# Patient Record
Sex: FEMALE | Race: WHITE | Employment: FULL TIME | URBAN - METROPOLITAN AREA
[De-identification: names, ages, dates, MRNs, and addresses within clinical notes are randomized per-mention and may not be internally consistent; named-entity substitution may affect disease eponyms.]

---

## 2022-08-31 ENCOUNTER — APPOINTMENT (OUTPATIENT)
Dept: CT IMAGING | Age: 57
End: 2022-08-31
Attending: PHYSICIAN ASSISTANT

## 2022-08-31 ENCOUNTER — APPOINTMENT (OUTPATIENT)
Dept: ULTRASOUND IMAGING | Age: 57
End: 2022-08-31
Attending: PHYSICIAN ASSISTANT

## 2022-08-31 ENCOUNTER — HOSPITAL ENCOUNTER (EMERGENCY)
Age: 57
Discharge: HOME | End: 2022-08-31
Attending: EMERGENCY MEDICINE

## 2022-08-31 VITALS
HEART RATE: 88 BPM | DIASTOLIC BLOOD PRESSURE: 82 MMHG | OXYGEN SATURATION: 97 % | HEIGHT: 65 IN | WEIGHT: 192 LBS | RESPIRATION RATE: 18 BRPM | TEMPERATURE: 97.1 F | SYSTOLIC BLOOD PRESSURE: 172 MMHG | BODY MASS INDEX: 31.99 KG/M2

## 2022-08-31 DIAGNOSIS — K80.50 BILIARY COLIC: Primary | ICD-10-CM

## 2022-08-31 DIAGNOSIS — N39.0 URINARY TRACT INFECTION WITHOUT HEMATURIA, SITE UNSPECIFIED: ICD-10-CM

## 2022-08-31 DIAGNOSIS — K76.0 HEPATIC STEATOSIS: ICD-10-CM

## 2022-08-31 DIAGNOSIS — R73.9 HYPERGLYCEMIA: ICD-10-CM

## 2022-08-31 LAB
ALBUMIN SERPL-MCNC: 3.9 G/DL (ref 3.4–5)
ALBUMIN/GLOB SERPL: 1.3 {RATIO} (ref 0.8–1.7)
ALP SERPL-CCNC: 124 U/L (ref 45–117)
ALT SERPL-CCNC: 61 U/L (ref 13–56)
ANION GAP SERPL CALC-SCNC: 7 MMOL/L (ref 3–18)
APPEARANCE UR: CLEAR
AST SERPL-CCNC: 40 U/L (ref 10–38)
BACTERIA URNS QL MICRO: ABNORMAL /HPF
BASOPHILS # BLD: 0 K/UL (ref 0–0.1)
BASOPHILS NFR BLD: 0 % (ref 0–2)
BILIRUB SERPL-MCNC: 0.7 MG/DL (ref 0.2–1)
BILIRUB UR QL: NEGATIVE
BUN SERPL-MCNC: 14 MG/DL (ref 7–18)
BUN/CREAT SERPL: 26 (ref 12–20)
CALCIUM SERPL-MCNC: 9.2 MG/DL (ref 8.5–10.1)
CHLORIDE SERPL-SCNC: 103 MMOL/L (ref 100–111)
CO2 SERPL-SCNC: 27 MMOL/L (ref 21–32)
COLOR UR: YELLOW
CREAT SERPL-MCNC: 0.53 MG/DL (ref 0.6–1.3)
DIFFERENTIAL METHOD BLD: ABNORMAL
EOSINOPHIL # BLD: 0.1 K/UL (ref 0–0.4)
EOSINOPHIL NFR BLD: 1 % (ref 0–5)
EPITH CASTS URNS QL MICRO: ABNORMAL /LPF (ref 0–5)
ERYTHROCYTE [DISTWIDTH] IN BLOOD BY AUTOMATED COUNT: 12.7 % (ref 11.6–14.5)
EST. AVERAGE GLUCOSE BLD GHB EST-MCNC: 255 MG/DL
GLOBULIN SER CALC-MCNC: 2.9 G/DL (ref 2–4)
GLUCOSE SERPL-MCNC: 334 MG/DL (ref 74–99)
GLUCOSE UR STRIP.AUTO-MCNC: >1000 MG/DL
HBA1C MFR BLD: 10.5 % (ref 4.2–5.6)
HCT VFR BLD AUTO: 40.8 % (ref 35–45)
HGB BLD-MCNC: 14.2 G/DL (ref 12–16)
HGB UR QL STRIP: NEGATIVE
IMM GRANULOCYTES # BLD AUTO: 0 K/UL (ref 0–0.04)
IMM GRANULOCYTES NFR BLD AUTO: 1 % (ref 0–0.5)
KETONES UR QL STRIP.AUTO: ABNORMAL MG/DL
LEUKOCYTE ESTERASE UR QL STRIP.AUTO: ABNORMAL
LIPASE SERPL-CCNC: 89 U/L (ref 73–393)
LYMPHOCYTES # BLD: 1.5 K/UL (ref 0.9–3.6)
LYMPHOCYTES NFR BLD: 23 % (ref 21–52)
MCH RBC QN AUTO: 29.8 PG (ref 24–34)
MCHC RBC AUTO-ENTMCNC: 34.8 G/DL (ref 31–37)
MCV RBC AUTO: 85.7 FL (ref 78–100)
MONOCYTES # BLD: 0.3 K/UL (ref 0.05–1.2)
MONOCYTES NFR BLD: 5 % (ref 3–10)
NEUTS SEG # BLD: 4.5 K/UL (ref 1.8–8)
NEUTS SEG NFR BLD: 70 % (ref 40–73)
NITRITE UR QL STRIP.AUTO: POSITIVE
NRBC # BLD: 0 K/UL (ref 0–0.01)
NRBC BLD-RTO: 0 PER 100 WBC
PH UR STRIP: 5.5 [PH] (ref 5–8)
PLATELET # BLD AUTO: 182 K/UL (ref 135–420)
PMV BLD AUTO: 9.6 FL (ref 9.2–11.8)
POTASSIUM SERPL-SCNC: 3.8 MMOL/L (ref 3.5–5.5)
PROT SERPL-MCNC: 6.8 G/DL (ref 6.4–8.2)
PROT UR STRIP-MCNC: 100 MG/DL
RBC # BLD AUTO: 4.76 M/UL (ref 4.2–5.3)
RBC #/AREA URNS HPF: ABNORMAL /HPF (ref 0–5)
SODIUM SERPL-SCNC: 137 MMOL/L (ref 136–145)
SP GR UR REFRACTOMETRY: >1.03 (ref 1–1.03)
UROBILINOGEN UR QL STRIP.AUTO: 1 EU/DL (ref 0.2–1)
WBC # BLD AUTO: 6.5 K/UL (ref 4.6–13.2)
WBC URNS QL MICRO: ABNORMAL /HPF (ref 0–5)

## 2022-08-31 PROCEDURE — 74177 CT ABD & PELVIS W/CONTRAST: CPT

## 2022-08-31 PROCEDURE — 74011000636 HC RX REV CODE- 636: Performed by: EMERGENCY MEDICINE

## 2022-08-31 PROCEDURE — 87077 CULTURE AEROBIC IDENTIFY: CPT

## 2022-08-31 PROCEDURE — 87086 URINE CULTURE/COLONY COUNT: CPT

## 2022-08-31 PROCEDURE — 87186 SC STD MICRODIL/AGAR DIL: CPT

## 2022-08-31 PROCEDURE — 80053 COMPREHEN METABOLIC PANEL: CPT

## 2022-08-31 PROCEDURE — 76705 ECHO EXAM OF ABDOMEN: CPT

## 2022-08-31 PROCEDURE — 85025 COMPLETE CBC W/AUTO DIFF WBC: CPT

## 2022-08-31 PROCEDURE — 99285 EMERGENCY DEPT VISIT HI MDM: CPT

## 2022-08-31 PROCEDURE — 83690 ASSAY OF LIPASE: CPT

## 2022-08-31 PROCEDURE — 81001 URINALYSIS AUTO W/SCOPE: CPT

## 2022-08-31 PROCEDURE — 96374 THER/PROPH/DIAG INJ IV PUSH: CPT

## 2022-08-31 PROCEDURE — 96375 TX/PRO/DX INJ NEW DRUG ADDON: CPT

## 2022-08-31 PROCEDURE — 74011250636 HC RX REV CODE- 250/636: Performed by: PHYSICIAN ASSISTANT

## 2022-08-31 PROCEDURE — 74011000258 HC RX REV CODE- 258: Performed by: PHYSICIAN ASSISTANT

## 2022-08-31 PROCEDURE — 83036 HEMOGLOBIN GLYCOSYLATED A1C: CPT

## 2022-08-31 RX ORDER — MORPHINE SULFATE 4 MG/ML
4 INJECTION INTRAVENOUS
Status: COMPLETED | OUTPATIENT
Start: 2022-08-31 | End: 2022-08-31

## 2022-08-31 RX ORDER — HYDROCODONE BITARTRATE AND ACETAMINOPHEN 5; 325 MG/1; MG/1
1 TABLET ORAL
Qty: 12 TABLET | Refills: 0 | Status: SHIPPED | OUTPATIENT
Start: 2022-08-31 | End: 2022-09-03

## 2022-08-31 RX ORDER — ONDANSETRON 2 MG/ML
4 INJECTION INTRAMUSCULAR; INTRAVENOUS
Status: COMPLETED | OUTPATIENT
Start: 2022-08-31 | End: 2022-08-31

## 2022-08-31 RX ORDER — CEPHALEXIN 500 MG/1
500 CAPSULE ORAL 3 TIMES DAILY
Qty: 21 CAPSULE | Refills: 0 | Status: SHIPPED | OUTPATIENT
Start: 2022-08-31 | End: 2022-09-07

## 2022-08-31 RX ORDER — KETOROLAC TROMETHAMINE 10 MG/1
10 TABLET, FILM COATED ORAL
Qty: 15 TABLET | Refills: 0 | Status: SHIPPED | OUTPATIENT
Start: 2022-08-31

## 2022-08-31 RX ADMIN — CEFTRIAXONE 1 G: 1 INJECTION, POWDER, FOR SOLUTION INTRAMUSCULAR; INTRAVENOUS at 13:18

## 2022-08-31 RX ADMIN — MORPHINE SULFATE 4 MG: 4 INJECTION INTRAVENOUS at 11:19

## 2022-08-31 RX ADMIN — IOPAMIDOL 100 ML: 612 INJECTION, SOLUTION INTRAVENOUS at 12:15

## 2022-08-31 RX ADMIN — ONDANSETRON 4 MG: 2 INJECTION INTRAMUSCULAR; INTRAVENOUS at 11:18

## 2022-08-31 NOTE — ED NOTES
Verbal shift change report given to Juan Carolina RN (oncoming nurse). Report included the following information SBAR, ED Summary and MAR.

## 2022-08-31 NOTE — DISCHARGE INSTRUCTIONS
Clear liquids. [place]ugio diet. Avoid high fatty foods  Medications as prescribed for pain related to abdominal pain  Urinary tract infection -urine culture is pending. Antibiotic as prescribed  Hemoglobin A1c is pending. Have your family physician follow-up with this lab  Worse?   Return to ER immediately

## 2022-09-03 LAB
BACTERIA SPEC CULT: ABNORMAL
CC UR VC: ABNORMAL
SERVICE CMNT-IMP: ABNORMAL

## 2022-09-04 NOTE — CALL BACK NOTE
8:29 AM  09/04/2022    On keflex for UTI. Sensitive per C&S. On appropriate tx.      Johnny Jo, MATT

## 2025-05-22 ENCOUNTER — APPOINTMENT (OUTPATIENT)
Facility: HOSPITAL | Age: 60
End: 2025-05-22

## 2025-05-22 ENCOUNTER — HOSPITAL ENCOUNTER (EMERGENCY)
Facility: HOSPITAL | Age: 60
Discharge: HOME | End: 2025-05-22

## 2025-05-22 VITALS
SYSTOLIC BLOOD PRESSURE: 137 MMHG | TEMPERATURE: 97.5 F | RESPIRATION RATE: 18 BRPM | OXYGEN SATURATION: 96 % | DIASTOLIC BLOOD PRESSURE: 64 MMHG | HEART RATE: 78 BPM

## 2025-05-22 DIAGNOSIS — R53.83 FATIGUE, UNSPECIFIED TYPE: ICD-10-CM

## 2025-05-22 DIAGNOSIS — R73.9 HYPERGLYCEMIA: ICD-10-CM

## 2025-05-22 DIAGNOSIS — N76.2 CELLULITIS OF LABIA MAJORA: ICD-10-CM

## 2025-05-22 DIAGNOSIS — N30.00 ACUTE CYSTITIS WITHOUT HEMATURIA: ICD-10-CM

## 2025-05-22 DIAGNOSIS — R03.0 ELEVATED BLOOD PRESSURE READING: ICD-10-CM

## 2025-05-22 DIAGNOSIS — R51.9 ACUTE NONINTRACTABLE HEADACHE, UNSPECIFIED HEADACHE TYPE: Primary | ICD-10-CM

## 2025-05-22 LAB
ALBUMIN SERPL-MCNC: 4.1 G/DL (ref 3.4–5)
ALBUMIN/GLOB SERPL: 1.5 (ref 0.8–1.7)
ALP SERPL-CCNC: 103 U/L (ref 45–117)
ALT SERPL-CCNC: 41 U/L (ref 10–35)
ANION GAP SERPL CALC-SCNC: 14 MMOL/L (ref 3–18)
APPEARANCE UR: CLEAR
AST SERPL-CCNC: 25 U/L (ref 10–38)
BACTERIA URNS QL MICRO: ABNORMAL /HPF
BASOPHILS # BLD: 0.03 K/UL (ref 0–0.1)
BASOPHILS NFR BLD: 0.3 % (ref 0–2)
BILIRUB SERPL-MCNC: 1 MG/DL (ref 0.2–1)
BILIRUB UR QL: NEGATIVE
BUN SERPL-MCNC: 8 MG/DL (ref 6–23)
BUN/CREAT SERPL: 20 (ref 12–20)
CALCIUM SERPL-MCNC: 9.8 MG/DL (ref 8.5–10.1)
CHLORIDE SERPL-SCNC: 97 MMOL/L (ref 98–107)
CO2 SERPL-SCNC: 25 MMOL/L (ref 21–32)
COLOR UR: YELLOW
CREAT SERPL-MCNC: 0.43 MG/DL (ref 0.6–1.3)
DIFFERENTIAL METHOD BLD: ABNORMAL
EOSINOPHIL # BLD: 0.05 K/UL (ref 0–0.4)
EOSINOPHIL NFR BLD: 0.5 % (ref 0–5)
EPITH CASTS URNS QL MICRO: ABNORMAL /LPF (ref 0–5)
ERYTHROCYTE [DISTWIDTH] IN BLOOD BY AUTOMATED COUNT: 12.3 % (ref 11.6–14.5)
GLOBULIN SER CALC-MCNC: 2.8 G/DL (ref 2–4)
GLUCOSE BLD STRIP.AUTO-MCNC: 260 MG/DL (ref 70–110)
GLUCOSE BLD STRIP.AUTO-MCNC: 290 MG/DL (ref 70–110)
GLUCOSE SERPL-MCNC: 294 MG/DL (ref 74–108)
GLUCOSE UR STRIP.AUTO-MCNC: >1000 MG/DL
HCT VFR BLD AUTO: 39.7 % (ref 35–45)
HGB BLD-MCNC: 13.9 G/DL (ref 12–16)
HGB UR QL STRIP: NEGATIVE
IMM GRANULOCYTES # BLD AUTO: 0.03 K/UL (ref 0–0.04)
IMM GRANULOCYTES NFR BLD AUTO: 0.3 % (ref 0–0.5)
KETONES UR QL STRIP.AUTO: 40 MG/DL
LEUKOCYTE ESTERASE UR QL STRIP.AUTO: ABNORMAL
LYMPHOCYTES # BLD: 1.86 K/UL (ref 0.9–3.3)
LYMPHOCYTES NFR BLD: 17.5 % (ref 21–52)
MCH RBC QN AUTO: 30.6 PG (ref 24–34)
MCHC RBC AUTO-ENTMCNC: 35 G/DL (ref 31–37)
MCV RBC AUTO: 87.4 FL (ref 78–100)
MONOCYTES # BLD: 0.63 K/UL (ref 0.05–1.2)
MONOCYTES NFR BLD: 5.9 % (ref 3–10)
NEUTS SEG # BLD: 8.03 K/UL (ref 1.8–8)
NEUTS SEG NFR BLD: 75.5 % (ref 40–73)
NITRITE UR QL STRIP.AUTO: NEGATIVE
NRBC # BLD: 0 K/UL (ref 0–0.01)
NRBC BLD-RTO: 0 PER 100 WBC
PH UR STRIP: 6 (ref 5–8)
PLATELET # BLD AUTO: 172 K/UL (ref 135–420)
PMV BLD AUTO: 10 FL (ref 9.2–11.8)
POTASSIUM SERPL-SCNC: 3.6 MMOL/L (ref 3.5–5.5)
PROT SERPL-MCNC: 6.8 G/DL (ref 6.4–8.2)
PROT UR STRIP-MCNC: 100 MG/DL
RBC # BLD AUTO: 4.54 M/UL (ref 4.2–5.3)
RBC #/AREA URNS HPF: NEGATIVE /HPF (ref 0–5)
SODIUM SERPL-SCNC: 135 MMOL/L (ref 136–145)
SP GR UR REFRACTOMETRY: >1.03 (ref 1–1.03)
TROPONIN T SERPL HS-MCNC: 6.7 NG/L (ref 0–14)
TROPONIN T SERPL HS-MCNC: 6.8 NG/L (ref 0–14)
UROBILINOGEN UR QL STRIP.AUTO: 1 EU/DL (ref 0.2–1)
WBC # BLD AUTO: 10.6 K/UL (ref 4.6–13.2)
WBC URNS QL MICRO: ABNORMAL /HPF (ref 0–5)

## 2025-05-22 PROCEDURE — 6360000002 HC RX W HCPCS

## 2025-05-22 PROCEDURE — 99284 EMERGENCY DEPT VISIT MOD MDM: CPT

## 2025-05-22 PROCEDURE — 70450 CT HEAD/BRAIN W/O DYE: CPT

## 2025-05-22 PROCEDURE — 81001 URINALYSIS AUTO W/SCOPE: CPT

## 2025-05-22 PROCEDURE — 80053 COMPREHEN METABOLIC PANEL: CPT

## 2025-05-22 PROCEDURE — 96374 THER/PROPH/DIAG INJ IV PUSH: CPT

## 2025-05-22 PROCEDURE — 93005 ELECTROCARDIOGRAM TRACING: CPT | Performed by: EMERGENCY MEDICINE

## 2025-05-22 PROCEDURE — 82962 GLUCOSE BLOOD TEST: CPT

## 2025-05-22 PROCEDURE — 6370000000 HC RX 637 (ALT 250 FOR IP)

## 2025-05-22 PROCEDURE — 84484 ASSAY OF TROPONIN QUANT: CPT

## 2025-05-22 PROCEDURE — 96361 HYDRATE IV INFUSION ADD-ON: CPT

## 2025-05-22 PROCEDURE — 2580000003 HC RX 258

## 2025-05-22 PROCEDURE — 85025 COMPLETE CBC W/AUTO DIFF WBC: CPT

## 2025-05-22 RX ORDER — 0.9 % SODIUM CHLORIDE 0.9 %
1000 INTRAVENOUS SOLUTION INTRAVENOUS
Status: COMPLETED | OUTPATIENT
Start: 2025-05-22 | End: 2025-05-22

## 2025-05-22 RX ORDER — ACETAMINOPHEN 325 MG/1
650 TABLET ORAL
Status: COMPLETED | OUTPATIENT
Start: 2025-05-22 | End: 2025-05-22

## 2025-05-22 RX ORDER — KETOROLAC TROMETHAMINE 15 MG/ML
15 INJECTION, SOLUTION INTRAMUSCULAR; INTRAVENOUS
Status: COMPLETED | OUTPATIENT
Start: 2025-05-22 | End: 2025-05-22

## 2025-05-22 RX ORDER — CEPHALEXIN 500 MG/1
500 CAPSULE ORAL 2 TIMES DAILY
Qty: 14 CAPSULE | Refills: 0 | Status: SHIPPED | OUTPATIENT
Start: 2025-05-22 | End: 2025-05-29

## 2025-05-22 RX ADMIN — SODIUM CHLORIDE 1000 ML: 0.9 INJECTION, SOLUTION INTRAVENOUS at 11:51

## 2025-05-22 RX ADMIN — KETOROLAC TROMETHAMINE 15 MG: 15 INJECTION, SOLUTION INTRAMUSCULAR; INTRAVENOUS at 13:31

## 2025-05-22 RX ADMIN — ACETAMINOPHEN 650 MG: 325 TABLET ORAL at 13:16

## 2025-05-22 ASSESSMENT — PAIN DESCRIPTION - DESCRIPTORS: DESCRIPTORS: ACHING

## 2025-05-22 ASSESSMENT — PAIN SCALES - GENERAL: PAINLEVEL_OUTOF10: 7

## 2025-05-22 ASSESSMENT — PAIN DESCRIPTION - LOCATION: LOCATION: HEAD

## 2025-05-22 NOTE — DISCHARGE INSTRUCTIONS
Thank you for choosing our Emergency Department for your care.  It is our privilege to care for you in your time of need.  In the next several days, you may receive a survey via email or mailed to your home about your experience with our team.  We would greatly appreciate you taking a few minutes to complete the survey, as we use this information to learn what we have done well and what we could be doing better. Thank you for trusting us with your care!    Below you will find a list of your tests from today's visit.   Labs and Radiology Studies  Recent Results (from the past 12 hours)   POCT Glucose    Collection Time: 05/22/25 10:25 AM   Result Value Ref Range    POC Glucose 290 (H) 70 - 110 mg/dL   EKG 12 Lead    Collection Time: 05/22/25 10:49 AM   Result Value Ref Range    Ventricular Rate 84 BPM    Atrial Rate 84 BPM    P-R Interval 160 ms    QRS Duration 78 ms    Q-T Interval 368 ms    QTc Calculation (Bazett) 434 ms    R Axis -19 degrees    T Axis 42 degrees    Diagnosis       Sinus rhythm with premature atrial complexes  Septal infarct , age undetermined  Abnormal ECG  No previous ECGs available     CBC with Auto Differential    Collection Time: 05/22/25 10:58 AM   Result Value Ref Range    WBC 10.6 4.6 - 13.2 K/uL    RBC 4.54 4.20 - 5.30 M/uL    Hemoglobin 13.9 12.0 - 16.0 g/dL    Hematocrit 39.7 35.0 - 45.0 %    MCV 87.4 78.0 - 100.0 FL    MCH 30.6 24.0 - 34.0 PG    MCHC 35.0 31.0 - 37.0 g/dL    RDW 12.3 11.6 - 14.5 %    Platelets 172 135 - 420 K/uL    MPV 10.0 9.2 - 11.8 FL    Nucleated RBCs 0.0 0  WBC    nRBC 0.00 0.00 - 0.01 K/uL    Neutrophils % 75.5 (H) 40.0 - 73.0 %    Lymphocytes % 17.5 (L) 21.0 - 52.0 %    Monocytes % 5.9 3.0 - 10.0 %    Eosinophils % 0.5 0.0 - 5.0 %    Basophils % 0.3 0.0 - 2.0 %    Immature Granulocytes % 0.3 0.0 - 0.5 %    Neutrophils Absolute 8.03 (H) 1.80 - 8.00 K/UL    Lymphocytes Absolute 1.86 0.90 - 3.30 K/UL    Monocytes Absolute 0.63 0.05 - 1.20 K/UL     Eosinophils Absolute 0.05 0.00 - 0.40 K/UL    Basophils Absolute 0.03 0.00 - 0.10 K/UL    Immature Granulocytes Absolute 0.03 0.00 - 0.04 K/UL    Differential Type AUTOMATED     Comprehensive Metabolic Panel    Collection Time: 05/22/25 10:58 AM   Result Value Ref Range    Sodium 135 (L) 136 - 145 mmol/L    Potassium 3.6 3.5 - 5.5 mmol/L    Chloride 97 (L) 98 - 107 mmol/L    CO2 25 21 - 32 mmol/L    Anion Gap 14 3 - 18 mmol/L    Glucose 294 (H) 74 - 108 mg/dL    BUN 8 6 - 23 MG/DL    Creatinine 0.43 (L) 0.60 - 1.30 MG/DL    BUN/Creatinine Ratio 20 12 - 20      Est, Glom Filt Rate >90 >60 ml/min/1.73m2    Calcium 9.8 8.5 - 10.1 MG/DL    Total Bilirubin 1.0 0.2 - 1.0 MG/DL    ALT 41 (H) 10 - 35 U/L    AST 25 10 - 38 U/L    Alk Phosphatase 103 45 - 117 U/L    Total Protein 6.8 6.4 - 8.2 g/dL    Albumin 4.1 3.4 - 5.0 g/dL    Globulin 2.8 2.0 - 4.0 g/dL    Albumin/Globulin Ratio 1.5 0.8 - 1.7     Troponin    Collection Time: 05/22/25 10:58 AM   Result Value Ref Range    Troponin T 6.8 0.0 - 14.0 ng/L   Urinalysis    Collection Time: 05/22/25 11:14 AM   Result Value Ref Range    Color, UA YELLOW      Appearance CLEAR      Specific Gravity, UA >1.030 (H) 1.003 - 1.030    pH, Urine 6.0 5.0 - 8.0      Protein,  (A) NEG mg/dL    Glucose, Ur >1000 (A) NEG mg/dL    Ketones, Urine 40 (A) NEG mg/dL    Bilirubin, Urine Negative NEG      Blood, Urine Negative NEG      Urobilinogen, Urine 1.0 0.2 - 1.0 EU/dL    Nitrite, Urine Negative NEG      Leukocyte Esterase, Urine TRACE (A) NEG     Urinalysis, Micro    Collection Time: 05/22/25 11:14 AM   Result Value Ref Range    WBC, UA 11-20 0 - 5 /hpf    RBC, UA Negative 0 - 5 /hpf    Epithelial Cells, UA FEW 0 - 5 /lpf    BACTERIA, URINE 2+ (A) NEG /hpf   Troponin    Collection Time: 05/22/25  2:10 PM   Result Value Ref Range    Troponin T 6.7 0.0 - 14.0 ng/L   POCT Glucose    Collection Time: 05/22/25  3:21 PM   Result Value Ref Range    POC Glucose 260 (H) 70 - 110 mg/dL     CT

## 2025-05-22 NOTE — ED PROVIDER NOTES
[facility] EMERGENCY DEPT  EMERGENCY DEPARTMENT HISTORY AND PHYSICAL EXAM      Date of evaluation: 5/22/2025  Patient Name: Nathalie Jeter  Birthdate 1965  MRN: [id number]  ED Provider: Zoe North PA-C   Note Started: 12:18 PM EDT 5/22/25    HISTORY OF PRESENT ILLNESS     Chief Complaint   Patient presents with    Headache    Hyperglycemia       History Provided By: Patient, only     HPI: Nathalie Jeter is a 60 y.o. female with PMH of type II DM, HTN, and nephrolithiasis who presents ambulatory to the ED as a referral from urgent care for fatigue and headache that began yesterday but worsened today.  When asked to elaborate on symptoms, patient states that she just feels generally unwell.  She states that she felt similar in the past when her blood glucose was high which is what prompted her to go to local urgent care.  POC glucose on arrival to urgent care was 310.  Patient states that she does not currently have a PCP and has not been on metformin for approximately 3 years.  She denies ever being hospitalized for hyperglycemia in the past.  She has never been on insulin.  No known sick contacts.  No fevers, chills, cough, congestion, rhinorrhea, sore throat, body aches, dizziness, lightheadedness, vision changes, tinnitus, neck pain/stiffness, nausea, vomiting, diarrhea, constipation, dysuria, urinary frequency/incontinence/retention, SOB, or CP. While at urgent care, EKG and UA were obtained.  Patient was told that she had \"many PACs\" and a UTI.    PAST MEDICAL HISTORY   Past Medical History:  Past Medical History:   Diagnosis Date    Kidney stones        Past Surgical History:  Past Surgical History:   Procedure Laterality Date    APPENDECTOMY         Family History:  No family history on file.    Social History:  Social History     Tobacco Use    Smoking status: Former   Substance Use Topics    Alcohol use: No       Allergies:  No Known Allergies    PCP: None, None    Current Meds:   No current  Cells, UA FEW 0 - 5 /lpf    BACTERIA, URINE 2+ (A) NEG /hpf   Troponin    Collection Time: 05/22/25  2:10 PM   Result Value Ref Range    Troponin T 6.7 0.0 - 14.0 ng/L   POCT Glucose    Collection Time: 05/22/25  3:21 PM   Result Value Ref Range    POC Glucose 260 (H) 70 - 110 mg/dL       EKG:.See ED Course Below    Radiologic Studies:  Radiographic images are visualized and preliminarily interpreted by the ED Provider with the following findings: See ED Course Below.     Interpretation per the Radiologist below, if available at the time of this note:  CT HEAD WO CONTRAST   Final Result   No acute intracranial hemorrhage, mass or infarct.             Electronically signed by Dewey So MD           Records Reviewed: Not Applicable    MEDICAL DECISION MAKING and ED COURSE   3:44 PM Differential and Considerations of tests not ordered: Patient presents ambulatory to the ED as a referral from urgent care for fatigue and headache that began yesterday but worsened today.  When asked to elaborate on symptoms, patient states that she just feels generally unwell.  She states that she felt similar in the past when her blood glucose was high which is what prompted her to go to local urgent care.  POC glucose on arrival to urgent care was 310.  Patient states that she does not currently have a PCP and has not been on metformin for approximately 3 years.  She denies ever being hospitalized for hyperglycemia in the past.  She has never been on insulin. While at urgent care, EKG and UA were obtained.  Patient was told that she had \"many PACs\" and a UTI.  Patient denies all other symptoms.    On arrival, patient appears in no acute distress.  BP elevated but other vital signs are reassuring.  Examination reveals no significant abnormalities other than erythema, warmth, tenderness, and induration to left labia without obvious fluctuance to suggest abscess.  Will likely prescribe antibiotics for suspected UTI which should also

## 2025-05-22 NOTE — ED TRIAGE NOTES
Patient ambulatory to ED, POV from urgent care. Patient c/o loss of appetite, headache and increased fatigue. States she went to urgent care and is hyperglycemic at 310, has a UTI and EKG showing PAC's.      Denies chest pain or SOB at this time. Able to speak in full sentences.     Triage blood glucose 218mg/dL

## 2025-05-23 LAB
EKG ATRIAL RATE: 84 BPM
EKG DIAGNOSIS: NORMAL
EKG P-R INTERVAL: 160 MS
EKG Q-T INTERVAL: 368 MS
EKG QRS DURATION: 78 MS
EKG QTC CALCULATION (BAZETT): 434 MS
EKG R AXIS: -19 DEGREES
EKG T AXIS: 42 DEGREES
EKG VENTRICULAR RATE: 84 BPM

## 2025-05-25 ENCOUNTER — HOSPITAL ENCOUNTER (EMERGENCY)
Facility: HOSPITAL | Age: 60
Discharge: HOME | End: 2025-05-25

## 2025-05-25 VITALS
TEMPERATURE: 97.3 F | RESPIRATION RATE: 16 BRPM | WEIGHT: 182 LBS | BODY MASS INDEX: 30.32 KG/M2 | OXYGEN SATURATION: 100 % | SYSTOLIC BLOOD PRESSURE: 166 MMHG | HEART RATE: 76 BPM | HEIGHT: 65 IN | DIASTOLIC BLOOD PRESSURE: 98 MMHG

## 2025-05-25 DIAGNOSIS — N76.2 CELLULITIS OF LABIA: ICD-10-CM

## 2025-05-25 DIAGNOSIS — Z86.39 HISTORY OF UNCONTROLLED DIABETES: ICD-10-CM

## 2025-05-25 DIAGNOSIS — L02.215 ABSCESS OF PERINEUM: Primary | ICD-10-CM

## 2025-05-25 LAB
ALBUMIN SERPL-MCNC: 3.6 G/DL (ref 3.4–5)
ALBUMIN/GLOB SERPL: 1 (ref 0.8–1.7)
ALP SERPL-CCNC: 149 U/L (ref 45–117)
ALT SERPL-CCNC: 40 U/L (ref 10–35)
ANION GAP SERPL CALC-SCNC: 14 MMOL/L (ref 3–18)
AST SERPL-CCNC: 26 U/L (ref 10–38)
BASOPHILS # BLD: 0.04 K/UL (ref 0–0.1)
BASOPHILS NFR BLD: 0.5 % (ref 0–2)
BILIRUB SERPL-MCNC: 0.5 MG/DL (ref 0.2–1)
BUN SERPL-MCNC: 13 MG/DL (ref 6–23)
BUN/CREAT SERPL: 32 (ref 12–20)
CALCIUM SERPL-MCNC: 10 MG/DL (ref 8.5–10.1)
CHLORIDE SERPL-SCNC: 99 MMOL/L (ref 98–107)
CO2 SERPL-SCNC: 23 MMOL/L (ref 21–32)
CREAT SERPL-MCNC: 0.42 MG/DL (ref 0.6–1.3)
DIFFERENTIAL METHOD BLD: ABNORMAL
EOSINOPHIL # BLD: 0.12 K/UL (ref 0–0.4)
EOSINOPHIL NFR BLD: 1.4 % (ref 0–5)
ERYTHROCYTE [DISTWIDTH] IN BLOOD BY AUTOMATED COUNT: 12.2 % (ref 11.6–14.5)
GLOBULIN SER CALC-MCNC: 3.6 G/DL (ref 2–4)
GLUCOSE BLD STRIP.AUTO-MCNC: 329 MG/DL (ref 70–110)
GLUCOSE SERPL-MCNC: 283 MG/DL (ref 74–108)
HCT VFR BLD AUTO: 42.6 % (ref 35–45)
HGB BLD-MCNC: 14.8 G/DL (ref 12–16)
IMM GRANULOCYTES # BLD AUTO: 0.03 K/UL (ref 0–0.04)
IMM GRANULOCYTES NFR BLD AUTO: 0.3 % (ref 0–0.5)
LYMPHOCYTES # BLD: 1.55 K/UL (ref 0.9–3.3)
LYMPHOCYTES NFR BLD: 18 % (ref 21–52)
MCH RBC QN AUTO: 30.5 PG (ref 24–34)
MCHC RBC AUTO-ENTMCNC: 34.7 G/DL (ref 31–37)
MCV RBC AUTO: 87.7 FL (ref 78–100)
MONOCYTES # BLD: 0.51 K/UL (ref 0.05–1.2)
MONOCYTES NFR BLD: 5.9 % (ref 3–10)
NEUTS SEG # BLD: 6.35 K/UL (ref 1.8–8)
NEUTS SEG NFR BLD: 73.9 % (ref 40–73)
NRBC # BLD: 0 K/UL (ref 0–0.01)
NRBC BLD-RTO: 0 PER 100 WBC
PLATELET # BLD AUTO: 208 K/UL (ref 135–420)
PMV BLD AUTO: 9.9 FL (ref 9.2–11.8)
POTASSIUM SERPL-SCNC: 3.7 MMOL/L (ref 3.5–5.5)
PROT SERPL-MCNC: 7.2 G/DL (ref 6.4–8.2)
RBC # BLD AUTO: 4.86 M/UL (ref 4.2–5.3)
SODIUM SERPL-SCNC: 136 MMOL/L (ref 136–145)
WBC # BLD AUTO: 8.6 K/UL (ref 4.6–13.2)

## 2025-05-25 PROCEDURE — 87070 CULTURE OTHR SPECIMN AEROBIC: CPT

## 2025-05-25 PROCEDURE — 87077 CULTURE AEROBIC IDENTIFY: CPT

## 2025-05-25 PROCEDURE — 87205 SMEAR GRAM STAIN: CPT

## 2025-05-25 PROCEDURE — 56405 I&D VULVA/PERINEAL ABSCESS: CPT

## 2025-05-25 PROCEDURE — 96375 TX/PRO/DX INJ NEW DRUG ADDON: CPT

## 2025-05-25 PROCEDURE — 2580000003 HC RX 258: Performed by: PHYSICIAN ASSISTANT

## 2025-05-25 PROCEDURE — 99284 EMERGENCY DEPT VISIT MOD MDM: CPT

## 2025-05-25 PROCEDURE — 80053 COMPREHEN METABOLIC PANEL: CPT

## 2025-05-25 PROCEDURE — 82962 GLUCOSE BLOOD TEST: CPT

## 2025-05-25 PROCEDURE — 85025 COMPLETE CBC W/AUTO DIFF WBC: CPT

## 2025-05-25 PROCEDURE — 87186 SC STD MICRODIL/AGAR DIL: CPT

## 2025-05-25 PROCEDURE — 6360000002 HC RX W HCPCS: Performed by: PHYSICIAN ASSISTANT

## 2025-05-25 PROCEDURE — 96365 THER/PROPH/DIAG IV INF INIT: CPT

## 2025-05-25 RX ORDER — DOXYCYCLINE HYCLATE 100 MG
100 TABLET ORAL 2 TIMES DAILY
Qty: 20 TABLET | Refills: 0 | Status: SHIPPED | OUTPATIENT
Start: 2025-05-25 | End: 2025-06-04

## 2025-05-25 RX ORDER — MORPHINE SULFATE 4 MG/ML
4 INJECTION, SOLUTION INTRAMUSCULAR; INTRAVENOUS
Refills: 0 | Status: COMPLETED | OUTPATIENT
Start: 2025-05-25 | End: 2025-05-25

## 2025-05-25 RX ORDER — ONDANSETRON 2 MG/ML
4 INJECTION INTRAMUSCULAR; INTRAVENOUS
Status: COMPLETED | OUTPATIENT
Start: 2025-05-25 | End: 2025-05-25

## 2025-05-25 RX ORDER — 0.9 % SODIUM CHLORIDE 0.9 %
1000 INTRAVENOUS SOLUTION INTRAVENOUS ONCE
Status: COMPLETED | OUTPATIENT
Start: 2025-05-25 | End: 2025-05-25

## 2025-05-25 RX ORDER — CLINDAMYCIN HYDROCHLORIDE 150 MG/1
300 CAPSULE ORAL 3 TIMES DAILY
Qty: 60 CAPSULE | Refills: 0 | Status: SHIPPED | OUTPATIENT
Start: 2025-05-25 | End: 2025-05-28 | Stop reason: ALTCHOICE

## 2025-05-25 RX ORDER — ONDANSETRON 4 MG/1
4 TABLET, ORALLY DISINTEGRATING ORAL EVERY 8 HOURS PRN
Qty: 20 TABLET | Refills: 0 | Status: SHIPPED | OUTPATIENT
Start: 2025-05-25

## 2025-05-25 RX ORDER — CLINDAMYCIN PHOSPHATE 900 MG/50ML
900 INJECTION, SOLUTION INTRAVENOUS ONCE
Status: COMPLETED | OUTPATIENT
Start: 2025-05-25 | End: 2025-05-25

## 2025-05-25 RX ORDER — FLUCONAZOLE 150 MG/1
150 TABLET ORAL ONCE
Qty: 1 TABLET | Refills: 0 | Status: SHIPPED | OUTPATIENT
Start: 2025-05-25 | End: 2025-05-25

## 2025-05-25 RX ADMIN — CLINDAMYCIN PHOSPHATE 900 MG: 900 INJECTION, SOLUTION INTRAVENOUS at 12:34

## 2025-05-25 RX ADMIN — ONDANSETRON 4 MG: 2 INJECTION, SOLUTION INTRAMUSCULAR; INTRAVENOUS at 11:17

## 2025-05-25 RX ADMIN — SODIUM CHLORIDE 1000 ML: 0.9 INJECTION, SOLUTION INTRAVENOUS at 11:17

## 2025-05-25 RX ADMIN — MORPHINE SULFATE 4 MG: 4 INJECTION, SOLUTION INTRAMUSCULAR; INTRAVENOUS at 11:17

## 2025-05-25 ASSESSMENT — PAIN DESCRIPTION - PAIN TYPE: TYPE: ACUTE PAIN

## 2025-05-25 ASSESSMENT — PAIN DESCRIPTION - DESCRIPTORS: DESCRIPTORS: ACHING;THROBBING

## 2025-05-25 ASSESSMENT — PAIN SCALES - GENERAL
PAINLEVEL_OUTOF10: 10
PAINLEVEL_OUTOF10: 10

## 2025-05-25 ASSESSMENT — PAIN DESCRIPTION - LOCATION
LOCATION: VAGINA
LOCATION: VAGINA

## 2025-05-25 ASSESSMENT — PAIN - FUNCTIONAL ASSESSMENT: PAIN_FUNCTIONAL_ASSESSMENT: 0-10

## 2025-05-25 ASSESSMENT — PAIN DESCRIPTION - FREQUENCY: FREQUENCY: CONTINUOUS

## 2025-05-25 ASSESSMENT — PAIN DESCRIPTION - ORIENTATION: ORIENTATION: LEFT

## 2025-05-25 NOTE — DISCHARGE INSTRUCTIONS
Remove packing in 48 hours  After packing removed, perform SITZ baths (warm water soak) three times a day for 10 minutes until healed  Finish antibiotics. Take probiotics three times a day  Return if symptoms worsen of fever begins

## 2025-05-25 NOTE — ED PROVIDER NOTES
[facility]  EMERGENCY DEPARTMENT ENCOUNTER       Pt Name: Nathalie Jeter  MRN: [id number]  Birthdate 1965  Date of evaluation: 5/25/2025  PCP: None, None  Note Started: 2:43 PM 5/25/25     CHIEF COMPLAINT       Chief Complaint   Patient presents with    Cyst        HISTORY OF PRESENT ILLNESS: 1 or more elements      History From: Patient  HPI Limitations: None  Chronic Conditions: HTN, DM, renal stones  Social Determinants affecting Dx or Tx: none      Nathalie Jeter is a 60 y.o. female who presents to ED c/o abscess. Pt notes tender worsening cyst/abscess to groin. Sxs x several days. Pt was seen at this facility on 05/22/25 for hyperglycemia. Pt was on metformin several years ago but stopped taking medication. Pt was found to have . Pt was not in DKA/HHS. Pt was found to have UTI. He was Rx keflex 500 mg BID and metformin. No fever, chills, vomiting, drainage     Nursing Notes were all reviewed and agreed with or any disagreements were addressed in the HPI.    PAST HISTORY     Past Medical History:  Past Medical History:   Diagnosis Date    Diabetes mellitus (HCC)     Kidney stones        Past Surgical History:  Past Surgical History:   Procedure Laterality Date    APPENDECTOMY         Family History:  No family history on file.    Social History:  Social History     Socioeconomic History    Marital status:    Tobacco Use    Smoking status: Former   Substance and Sexual Activity    Alcohol use: No    Drug use: Never       Allergies:  No Known Allergies    CURRENT MEDICATIONS      No current facility-administered medications for this encounter.     Current Outpatient Medications   Medication Sig Dispense Refill    doxycycline hyclate (VIBRA-TABS) 100 MG tablet Take 1 tablet by mouth 2 times daily for 10 days 20 tablet 0    clindamycin (CLEOCIN) 150 MG capsule Take 2 capsules by mouth 3 times daily for 10 days 60 capsule 0    ondansetron (ZOFRAN-ODT) 4 MG disintegrating tablet

## 2025-05-28 ENCOUNTER — RESULTS FOLLOW-UP (OUTPATIENT)
Facility: HOSPITAL | Age: 60
End: 2025-05-28

## 2025-05-28 LAB
BACTERIA SPEC CULT: ABNORMAL
GRAM STN SPEC: ABNORMAL
GRAM STN SPEC: ABNORMAL
SERVICE CMNT-IMP: ABNORMAL

## 2025-05-28 NOTE — RESULT ENCOUNTER NOTE
Wound culture reviewed.  Called and spoke to patient regarding her abscess, states it is improving, she is no longer having fevers.  She denies handling birds but has a bird feeder and indoor cat; is somewhat immunocompromised due to recent dx of DM.  Susceptibility reviewed, sensitive to ampicillin so we will try Augmentin and discontinue clindamycin but finish doxycycline as previously prescribed.  Discussed with ED attending Dr. Pedraza who agrees with above plan.

## 2025-05-30 ENCOUNTER — HOSPITAL ENCOUNTER (EMERGENCY)
Facility: HOSPITAL | Age: 60
Discharge: HOME | End: 2025-05-30

## 2025-05-30 VITALS
DIASTOLIC BLOOD PRESSURE: 74 MMHG | RESPIRATION RATE: 16 BRPM | HEIGHT: 65 IN | SYSTOLIC BLOOD PRESSURE: 170 MMHG | BODY MASS INDEX: 30.16 KG/M2 | HEART RATE: 97 BPM | OXYGEN SATURATION: 99 % | TEMPERATURE: 97.6 F | WEIGHT: 181 LBS

## 2025-05-30 DIAGNOSIS — R05.8 DRY COUGH: ICD-10-CM

## 2025-05-30 DIAGNOSIS — N90.9 LESION OF FEMALE PERINEUM: Primary | ICD-10-CM

## 2025-05-30 PROCEDURE — 99283 EMERGENCY DEPT VISIT LOW MDM: CPT

## 2025-05-30 RX ORDER — BENZONATATE 100 MG/1
100-200 CAPSULE ORAL 3 TIMES DAILY PRN
Qty: 30 CAPSULE | Refills: 0 | Status: SHIPPED | OUTPATIENT
Start: 2025-05-30 | End: 2025-06-06

## 2025-05-30 ASSESSMENT — PAIN SCALES - GENERAL: PAINLEVEL_OUTOF10: 5

## 2025-05-30 ASSESSMENT — PAIN - FUNCTIONAL ASSESSMENT: PAIN_FUNCTIONAL_ASSESSMENT: 0-10

## 2025-05-30 ASSESSMENT — PAIN DESCRIPTION - PAIN TYPE: TYPE: ACUTE PAIN

## 2025-05-30 ASSESSMENT — PAIN DESCRIPTION - DESCRIPTORS: DESCRIPTORS: ACHING

## 2025-05-30 ASSESSMENT — PAIN DESCRIPTION - LOCATION: LOCATION: VAGINA

## 2025-05-30 ASSESSMENT — PAIN DESCRIPTION - FREQUENCY: FREQUENCY: CONTINUOUS

## 2025-05-30 NOTE — ED PROVIDER NOTES
EMERGENCY DEPARTMENT HISTORY & PHYSICAL EXAM    5/30/25, 2:35 PM EDT    Clinical Impression:  1. Lesion of female perineum    2. Dry cough        Assessment/Differential Diagnosis:     Ddx perineum abscess, cellulitis, mass, hyperglycemia, URI, viral infection, bacterial infection all considered    ED Course:   Initial assessment performed. The patients presenting problems have been discussed, and they are in agreement with the care plan formulated and outlined with them.  I have encouraged them to ask questions as they arise throughout their visit.    Patient comes to the ED with concern of continued discomfort left perineum.  Patient states she was seen here earlier this week, incision and drainage was done.  There was packing which she removed 2 days later.  She has been taking antibiotics as prescribed.  She still has some tenderness that concerned her so she came back for reevaluation.  There is been no fever, chills or flulike illness.  She has noticed no new drainage.  There is tenderness to the area.  She denies any urinary symptoms or vaginal discharge.  She is not sexually active, no concern for STDs.  Patient also has had some clear rhinorrhea and a somewhat persistent irritating, dry cough for the past few days.  No shortness of breath or chest pain.  No abdominal symptoms.  Patient states she does have seasonal allergies but is taking medication for that at this time.  Patient is diabetic, restarted her metformin, states her blood sugars in the 230s at home    Exam with middle-age female sitting up on stretcher.  She does have a persistent dry cough when she attempts to talk.  Appears to have some irritation in the back of her throat that is causing her cough.  Vitals with elevated blood pressure.  She otherwise appears comfortable in no distress.  TMs normal, nose with clear congestion, throat with mild irritation.  Neck is supple without adenopathy.  Heart  of breath or chest pain.  No abdominal symptoms.  Patient states she does have seasonal allergies but is taking medication for that at this time.  Patient is diabetic, restarted her metformin, states her blood sugars in the 230s at home      I have reviewed all PMHX, FMHX and Social Hx as entered into the medical record in the chart below using the Epic Template.    Review of Systems:  Constitutional: neg for fever, chills.  ENT:  neg for sore throat, positive for rhinorrhea, negative for ear pain  Respiratory:  positive for cough, no shortness of breath  Cardiovascular:  neg for chest pain  GI:  neg for abdominal pain. No n/v/d.  :  No dysuria, hematuria. No Flank pain.  MSK: negative for arthralgias, neg for myalgias  Integumentary: no rashes, or skin trauma, + vulvar lesion  Neurological: neg for headaches  All other systems reviewed negative with exception of positives in ROS and HPI.    Past Medical History:  Past Medical History:   Diagnosis Date    Diabetes mellitus (HCC)     Kidney stones        Past Surgical History:  Past Surgical History:   Procedure Laterality Date    APPENDECTOMY         Family History:  History reviewed. No pertinent family history.    Social History:  Social History     Tobacco Use    Smoking status: Former   Substance Use Topics    Alcohol use: No    Drug use: Never       Allergies:  No Known Allergies    Vital Signs:  Vitals:    05/30/25 1420   BP: (!) 170/74   Pulse: 97   Resp: 16   Temp: 97.6 °F (36.4 °C)   TempSrc: Oral   SpO2: 99%   Weight: 82.1 kg (181 lb)   Height: 1.651 m (5' 5\")     Physical Exam:  Vital Signs Reviewed. Nursing Notes Reviewed.  Constitutional:  Well developed, well nourished patient. Appearance and behavior are age and situation appropriate.  Head: Normocephalic, Atraumatic  Eyes: Conjunctiva clear, lids normal. Sclera anicteric. PERRL.  ENT:  gross hearing normal, throat normal   Neck:  Supple, FROM. Trachea midline. No nuchal rigidity.   Lungs: Lungs

## 2025-05-30 NOTE — DISCHARGE INSTRUCTIONS
Lung exam with no concerning findings, no x-ray done today.  If your cough continues, you get a fever or shortness of breath, you should return for further evaluation.  Tessalon Perles were prescribed to help with cough.  Continue any allergy medication as this could be contributing factor  For your vulvar lesion, continue antibiotic as prescribed.  Salt water baths, 10 to 15 minutes, 3-4 times a day, keep covered with dry gauze. return if any worsening symptoms.  Return if any new or worsening symptoms or any new concerns